# Patient Record
Sex: FEMALE | Race: WHITE
[De-identification: names, ages, dates, MRNs, and addresses within clinical notes are randomized per-mention and may not be internally consistent; named-entity substitution may affect disease eponyms.]

---

## 2017-01-11 ENCOUNTER — HOSPITAL ENCOUNTER (EMERGENCY)
Dept: HOSPITAL 10 - FTE | Age: 42
Discharge: HOME | End: 2017-01-11
Payer: COMMERCIAL

## 2017-01-11 VITALS
HEIGHT: 63 IN | HEIGHT: 63 IN | BODY MASS INDEX: 26.17 KG/M2 | WEIGHT: 147.71 LBS | BODY MASS INDEX: 26.17 KG/M2 | WEIGHT: 147.71 LBS

## 2017-01-11 DIAGNOSIS — M79.671: Primary | ICD-10-CM

## 2017-01-11 DIAGNOSIS — I10: ICD-10-CM

## 2017-01-11 DIAGNOSIS — E11.9: ICD-10-CM

## 2017-01-11 LAB
ALBUMIN SERPL-MCNC: 4.3 G/DL (ref 3.3–4.9)
ALBUMIN/GLOB SERPL: 1.38 {RATIO}
ALP SERPL-CCNC: 53 IU/L (ref 42–121)
ALT SERPL-CCNC: 21 IU/L (ref 13–69)
ANION GAP SERPL CALC-SCNC: 15 MMOL/L (ref 8–16)
AST SERPL-CCNC: 19 IU/L (ref 15–46)
BASOPHILS # BLD AUTO: 0 10^3/UL (ref 0–0.1)
BASOPHILS NFR BLD: 0.3 % (ref 0–2)
BILIRUB DIRECT SERPL-MCNC: 0 MG/DL (ref 0–0.2)
BILIRUB SERPL-MCNC: 0.1 MG/DL (ref 0.2–1.3)
BUN SERPL-MCNC: 10 MG/DL (ref 7–20)
CALCIUM SERPL-MCNC: 9.9 MG/DL (ref 8.4–10.2)
CHLORIDE SERPL-SCNC: 103 MMOL/L (ref 97–110)
CO2 SERPL-SCNC: 27 MMOL/L (ref 21–31)
CONDITION: 1
CREAT SERPL-MCNC: 0.69 MG/DL (ref 0.44–1)
CRP SERPL-MCNC: < 0.5 MG/DL (ref 0–0.9)
EOSINOPHIL # BLD: 0.1 10^3/UL (ref 0–0.5)
EOSINOPHIL NFR BLD: 0.7 % (ref 0–7)
ERYTHROCYTE [DISTWIDTH] IN BLOOD BY AUTOMATED COUNT: 14.1 % (ref 11.5–14.5)
GLOBULIN SER-MCNC: 3.1 G/DL (ref 1.3–3.2)
GLUCOSE SERPL-MCNC: 92 MG/DL (ref 70–220)
HCT VFR BLD CALC: 37.9 % (ref 37–47)
HGB BLD-MCNC: 12.7 G/DL (ref 12–16)
LYMPHOCYTES # BLD AUTO: 2.6 10^3/UL (ref 0.8–2.9)
LYMPHOCYTES NFR BLD AUTO: 21.8 % (ref 15–51)
MCH RBC QN AUTO: 29.5 PG (ref 29–33)
MCHC RBC AUTO-ENTMCNC: 33.4 G/DL (ref 32–37)
MCV RBC AUTO: 88.2 FL (ref 82–101)
MONOCYTES # BLD: 1.2 10^3/UL (ref 0.3–0.9)
MONOCYTES NFR BLD: 10.1 % (ref 0–11)
NEUTROPHILS # BLD: 8 10^3/UL (ref 1.6–7.5)
NEUTROPHILS NFR BLD AUTO: 67.1 % (ref 39–77)
NRBC # BLD MANUAL: 0 10^3/UL (ref 0–0)
NRBC BLD QL: 0 /100WBC (ref 0–0)
PLATELET # BLD: 267 10^3/UL (ref 140–440)
PMV BLD AUTO: 8.5 FL (ref 7.4–10.4)
POTASSIUM SERPL-SCNC: 4 MMOL/L (ref 3.5–5.1)
PROT SERPL-MCNC: 7.4 G/DL (ref 6.1–8.1)
RBC # BLD AUTO: 4.3 10^6/UL (ref 4.2–5.4)
SODIUM SERPL-SCNC: 141 MMOL/L (ref 135–144)
WBC # BLD AUTO: 11.9 10^3/UL (ref 4.8–10.8)
WBC # BLD: 11.9 10^3/UL (ref 4.8–10.8)

## 2017-01-11 PROCEDURE — 85651 RBC SED RATE NONAUTOMATED: CPT

## 2017-01-11 PROCEDURE — 36415 COLL VENOUS BLD VENIPUNCTURE: CPT

## 2017-01-11 PROCEDURE — 80053 COMPREHEN METABOLIC PANEL: CPT

## 2017-01-11 PROCEDURE — 86140 C-REACTIVE PROTEIN: CPT

## 2017-01-11 PROCEDURE — 73610 X-RAY EXAM OF ANKLE: CPT

## 2017-01-11 PROCEDURE — 85025 COMPLETE CBC W/AUTO DIFF WBC: CPT

## 2017-01-11 PROCEDURE — 73630 X-RAY EXAM OF FOOT: CPT

## 2017-01-11 NOTE — RADRPT
PROCEDURE:   XR Right Ankle. 

 

CLINICAL INDICATION:   ankle pain 

 

TECHNIQUE:   AP, oblique and lateral views of the right ankle were performed. 

 

COMPARISON:   None. 

 

FINDINGS:

There is no evidence of acute fractures or dislocations.  The bony mineralization is normal.  No foc
al bony blastic or lytic lesions.  The soft tissues are unremarkable.

 

IMPRESSION:

Unremarkable right ankle. 

RPTAT:AAJJ

_____________________________________________ 

Physician Kari           Date    Time 

Electronically viewed and signed by BERNARDO Deal Physician on 01/11/2017 14:46 

 

D:  01/11/2017 14:46  T:  01/11/2017 14:46

/

## 2017-01-11 NOTE — ERD
ER Documentation


Chief Complaint


Date/Time


DATE: 1/11/17 


TIME: 16:00


Chief Complaint


woke up with rt foot pain





HPI


This is a 41-year-old female presents to the ER with right foot pain that 

started this morning.  Patient states that right foot pain is severe and 

constant it is worse whenever she puts any weight on it.  Patient tried taking 

Advil however it does not work.  Patient also admits to right foot swelling.  

She did not fall.  Patient has not been exercising lately she denies any fevers 

or chills.





ROS


12 point review of systems was done, all negative except per HPI.





Medications


Home Meds


Active Scripts


Hydrocodone/Acetaminophen (Norco 5-325 Tablet) 1 Each Tablet, 1 TAB PO Q6H Y 

for PAIN, #15 TAB


   Prov:KARL BOYCE         1/11/17


Ibuprofen* (Motrin*) 600 Mg Tab, 600 MG PO Q6, #30 TAB


   Prov:NIC BOYCENA C         1/11/17





PMhx/Soc


Hx Cardiac Disorders:  Yes (HNT)


Hx Miscellaneous Medical Probl:  Yes (DM)


Hx Alcohol Use:  No


Hx Substance Use:  No


Hx Tobacco Use:  No





Physical Exam


Vitals





Vital Signs








  Date Time  Temp Pulse Resp B/P Pulse Ox O2 Delivery O2 Flow Rate FiO2


 


1/11/17 12:09 97.9 88 16 148/90 98   








Physical Exam


GENERAL: The patient is well developed and appropriate for usual state of health

, in no apparent distress.


HEENT: Atraumatic.  


CHEST: Clear to auscultation bilaterally. There are no rales, wheezes or 

rhonchi. 


HEART: Regular rate and rhythm. No murmurs, clicks, rubs or gallops.


ABDOMEN: Soft, nontender and nondistended. Good bowel sounds. No rebound or 

guarding. No gross peritonitis. No gross organomegaly or masses. No Fernandez sign 

or McBurney point tenderness.


BACK: No midline or flank tenderness.


EXTREMITIES: Tender to palpation over the plantar fascia, painful foot flexion.

  Negative tarsal twist test negative squeeze test.  +2 pulses


NEURO: Alert and oriented


Result Diagram:  


1/11/17 1350                                                                   

             1/11/17 1350





Results 24 hrs





 Laboratory Tests








Test


  1/11/17


13:50


 


Alanine Aminotransferase


(ALT/SGPT) 21IU/L 


 


 


Albumin 4.3g/dl 


 


Albumin/Globulin Ratio 1.38 


 


Alkaline Phosphatase 53IU/L 


 


Anion Gap 15 


 


Aspartate Amino Transf


(AST/SGOT) 19IU/L 


 


 


Basophils # 0.010^3/ul 


 


Basophils % 0.3% 


 


Blood Urea Nitrogen 10mg/dl 


 


C-Reactive Protein < 0.5mg/dl 


 


Calcium Level 9.9mg/dl 


 


Carbon Dioxide Level 27mmol/L 


 


Chloride Level 103mmol/L 


 


Creatinine 0.69mg/dl 


 


Direct Bilirubin 0.00mg/dl 


 


Eosinophils # 0.110^3/ul 


 


Eosinophils % 0.7% 


 


Erythrocyte Sedimentation Rate 18mm/Hr 


 


Globulin 3.10g/dl 


 


Glucose Level 92mg/dl 


 


Hematocrit 37.9% 


 


Hemoglobin 12.7g/dl 


 


Indirect Bilirubin 0.1mg/dl 


 


Lymphocytes # 2.610^3/ul 


 


Lymphocytes % 21.8% 


 


Mean Corpuscular Hemoglobin 29.5pg 


 


Mean Corpuscular Hemoglobin


Concent 33.4g/dl 


 


 


Mean Corpuscular Volume 88.2fl 


 


Mean Platelet Volume 8.5fl 


 


Monocytes # 1.210^3/ul 


 


Monocytes % 10.1% 


 


Neutrophils # 8.010^3/ul 


 


Neutrophils % 67.1% 


 


Nucleated Red Blood Cells # 0.010^3/ul 


 


Nucleated Red Blood Cells % 0.0/100WBC 


 


Platelet Count 27599^3/UL 


 


Potassium Level 4.0mmol/L 


 


Red Blood Count 4.3010^6/ul 


 


Red Cell Distribution Width 14.1% 


 


Sodium Level 141mmol/L 


 


Total Bilirubin 0.1mg/dl 


 


Total Protein 7.4g/dl 


 


White Blood Count 11.910^3/ul 








 Current Medications








 Medications


  (Trade)  Dose


 Ordered  Sig/Gretchen


 Route


 PRN Reason  Start Time


 Stop Time Status Last Admin


Dose Admin


 


 Acetaminophen/


 Hydrocodone Bitart


  (Norco (5/325))  1 tab  ONCE  ONCE


 PO


   1/11/17 14:00


 1/11/17 14:01 DC 1/11/17 14:05


 











Procedures/MDM


Differential diagnosis includes but is not limited to; sprain, fracture, 

dislocation, plantar fasciitis, septic joint, septic arthritis, acute 

compartment syndrome, rhabdomyolysis.  This is likely plantar fasciitis.  

Patient is tender over the plantar fascia.  Suspicion for septic arthritis or 

septic joint is low as patient is afebrile and well-appearing.  CRP and ESR 

were both negative.  I doubt acute compartment syndrome or a rhabdo.  Patient 

will be sent home with ibuprofen and Norco.  She is to follow-up with her 

primary care doctor within 1-2 days or return to ER sooner if symptoms worsen.  

I shared with the patient she understands and agrees with plan





Departure


Diagnosis:  


 Primary Impression:  


 Foot pain


Condition:  Stable


Patient Instructions:  What Is Plantar Fasciitis?





Additional Instructions:  


Llame al doctor MAANA y malissa umesh JAVIER PARA DENTRO DE 1-2 RUSSELL.Dgale a la 

secretaria que nosotros le instruimos hacer esta javier.Avise o llame si miller 

condicin se empeora antes de la javier. Regresa aqui si peor o no mejor.











KARL BOYCE Jan 11, 2017 16:03

## 2017-01-11 NOTE — RADRPT
PROCEDURE:  Right foot series 

 

CLINICAL INDICATION:   Pain 

 

TECHNIQUE:   AP, lateral and oblique views of the right foot was obtained. 

 

COMPARISON:   Right ankle study done same day 

 

FINDINGS:

There is no evidence of acute fractures or dislocation.  The bony mineralization is normal.  There a
re no focal bony blastic or lytic lesions.  No evidence of erosions.  Soft tissues are unremarkable.

 

IMPRESSION:

Unremarkable right foot series.

 

RPTAT:AAJJ

_____________________________________________ 

Physician Kari           Date    Time 

Electronically viewed and signed by BERNARDO Deal Physician on 01/11/2017 14:46 

 

D:  01/11/2017 14:46  T:  01/11/2017 14:46

BM/

## 2018-01-12 ENCOUNTER — HOSPITAL ENCOUNTER (EMERGENCY)
Age: 43
Discharge: HOME | End: 2018-01-12

## 2018-01-12 ENCOUNTER — HOSPITAL ENCOUNTER (EMERGENCY)
Dept: HOSPITAL 91 - E/R | Age: 43
Discharge: HOME | End: 2018-01-12
Payer: SELF-PAY

## 2018-01-12 DIAGNOSIS — E11.9: ICD-10-CM

## 2018-01-12 DIAGNOSIS — R51: Primary | ICD-10-CM

## 2018-01-12 DIAGNOSIS — G89.29: ICD-10-CM

## 2018-01-12 DIAGNOSIS — R07.9: ICD-10-CM

## 2018-01-12 LAB
ADD MAN DIFF?: NO
ANION GAP: 16 (ref 8–16)
BASOPHIL #: 0 10^3/UL (ref 0–0.1)
BASOPHILS %: 0.2 % (ref 0–2)
BLOOD UREA NITROGEN: 15 MG/DL (ref 7–20)
CALCIUM: 9.7 MG/DL (ref 8.4–10.2)
CARBON DIOXIDE: 23 MMOL/L (ref 21–31)
CHLORIDE: 105 MMOL/L (ref 97–110)
CREATININE: 0.8 MG/DL (ref 0.44–1)
EOSINOPHILS #: 0.1 10^3/UL (ref 0–0.5)
EOSINOPHILS %: 0.7 % (ref 0–7)
GLUCOSE: 103 MG/DL (ref 70–220)
HEMATOCRIT: 39.2 % (ref 37–47)
HEMOGLOBIN: 13 G/DL (ref 12–16)
INR: 0.89
LYMPHOCYTES #: 3.2 10^3/UL (ref 0.8–2.9)
LYMPHOCYTES %: 27.7 % (ref 15–51)
MEAN CORPUSCULAR HEMOGLOBIN: 28.8 PG (ref 29–33)
MEAN CORPUSCULAR HGB CONC: 33.2 G/DL (ref 32–37)
MEAN CORPUSCULAR VOLUME: 86.9 FL (ref 82–101)
MEAN PLATELET VOLUME: 10.4 FL (ref 7.4–10.4)
MONOCYTE #: 1.2 10^3/UL (ref 0.3–0.9)
MONOCYTES %: 10.2 % (ref 0–11)
NEUTROPHIL #: 7.1 10^3/UL (ref 1.6–7.5)
NEUTROPHILS %: 60.9 % (ref 39–77)
NUCLEATED RED BLOOD CELLS #: 0 10^3/UL (ref 0–0)
NUCLEATED RED BLOOD CELLS%: 0 /100WBC (ref 0–0)
PARTIAL THROMBOPLASTIN TIME: 28.1 SEC (ref 25–35)
PLATELET COUNT: 295 10^3/UL (ref 140–415)
POTASSIUM: 4 MMOL/L (ref 3.5–5.1)
PROTIME: 12.1 SEC (ref 11.9–14.9)
PT RATIO: 0.9
RED BLOOD COUNT: 4.51 10^6/UL (ref 4.2–5.4)
RED CELL DISTRIBUTION WIDTH: 13.5 % (ref 11.5–14.5)
SODIUM: 140 MMOL/L (ref 135–144)
TROPONIN-I: < 0.012 NG/ML (ref 0–0.12)
WHITE BLOOD COUNT: 11.7 10^3/UL (ref 4.8–10.8)

## 2018-01-12 PROCEDURE — 93005 ELECTROCARDIOGRAM TRACING: CPT

## 2018-01-12 PROCEDURE — 99285 EMERGENCY DEPT VISIT HI MDM: CPT

## 2018-01-12 PROCEDURE — 85610 PROTHROMBIN TIME: CPT

## 2018-01-12 PROCEDURE — 85378 FIBRIN DEGRADE SEMIQUANT: CPT

## 2018-01-12 PROCEDURE — 80048 BASIC METABOLIC PNL TOTAL CA: CPT

## 2018-01-12 PROCEDURE — 84703 CHORIONIC GONADOTROPIN ASSAY: CPT

## 2018-01-12 PROCEDURE — 96374 THER/PROPH/DIAG INJ IV PUSH: CPT

## 2018-01-12 PROCEDURE — 71045 X-RAY EXAM CHEST 1 VIEW: CPT

## 2018-01-12 PROCEDURE — 82962 GLUCOSE BLOOD TEST: CPT

## 2018-01-12 PROCEDURE — 85025 COMPLETE CBC W/AUTO DIFF WBC: CPT

## 2018-01-12 PROCEDURE — 84484 ASSAY OF TROPONIN QUANT: CPT

## 2018-01-12 PROCEDURE — 70450 CT HEAD/BRAIN W/O DYE: CPT

## 2018-01-12 PROCEDURE — 36415 COLL VENOUS BLD VENIPUNCTURE: CPT

## 2018-01-12 PROCEDURE — 85730 THROMBOPLASTIN TIME PARTIAL: CPT

## 2018-01-12 PROCEDURE — 96375 TX/PRO/DX INJ NEW DRUG ADDON: CPT

## 2018-01-12 RX ADMIN — DIPHENHYDRAMINE HYDROCHLORIDE 1 MG: 50 INJECTION, SOLUTION INTRAMUSCULAR; INTRAVENOUS at 20:02

## 2018-01-12 RX ADMIN — THIAMINE HYDROCHLORIDE 1 MLS/HR: 100 INJECTION, SOLUTION INTRAMUSCULAR; INTRAVENOUS at 20:02

## 2018-01-12 RX ADMIN — KETOROLAC TROMETHAMINE 1 MG: 30 INJECTION, SOLUTION INTRAMUSCULAR at 23:02

## 2018-01-12 RX ADMIN — METOCLOPRAMIDE HYDROCHLORIDE 1 MG: 10 INJECTION, SOLUTION INTRAMUSCULAR; INTRAVENOUS at 20:02
